# Patient Record
Sex: MALE | URBAN - METROPOLITAN AREA
[De-identification: names, ages, dates, MRNs, and addresses within clinical notes are randomized per-mention and may not be internally consistent; named-entity substitution may affect disease eponyms.]

---

## 2018-06-04 ENCOUNTER — APPOINTMENT (RX ONLY)
Age: 43
Setting detail: DERMATOLOGY
End: 2018-06-04

## 2018-06-04 DIAGNOSIS — L80 VITILIGO: ICD-10-CM

## 2018-06-04 PROCEDURE — 96920 EXCIMER LSR PSRIASIS<250SQCM: CPT

## 2018-06-04 PROCEDURE — ? COUNSELING

## 2018-06-04 PROCEDURE — ? PATIENT SPECIFIC COUNSELING

## 2018-06-04 PROCEDURE — ? OBSERVATION

## 2018-06-04 PROCEDURE — ? XTRAC LASER

## 2018-06-04 ASSESSMENT — SEVERITY VITILIGO: VITILIGO SEVERITY: 2

## 2018-06-04 ASSESSMENT — LOCATION ZONE DERM: LOCATION ZONE: FACE

## 2018-06-04 ASSESSMENT — LOCATION DETAILED DESCRIPTION DERM
LOCATION DETAILED: LEFT SUPERIOR MEDIAL BUCCAL CHEEK
LOCATION DETAILED: LEFT INFERIOR CENTRAL MALAR CHEEK

## 2018-06-04 ASSESSMENT — LOCATION SIMPLE DESCRIPTION DERM: LOCATION SIMPLE: LEFT CHEEK

## 2018-06-04 NOTE — PROCEDURE: PATIENT SPECIFIC COUNSELING
I have recommended staying away from hydrochlorothiazide, as it increases the risk of cancer on the lip
Detail Level: Simple

## 2018-06-07 ENCOUNTER — APPOINTMENT (RX ONLY)
Age: 43
Setting detail: DERMATOLOGY
End: 2018-06-07

## 2018-06-07 DIAGNOSIS — L80 VITILIGO: ICD-10-CM

## 2018-06-07 PROCEDURE — 96920 EXCIMER LSR PSRIASIS<250SQCM: CPT

## 2018-06-07 PROCEDURE — ? XTRAC LASER

## 2018-06-07 ASSESSMENT — LOCATION SIMPLE DESCRIPTION DERM: LOCATION SIMPLE: LEFT CHEEK

## 2018-06-07 ASSESSMENT — LOCATION ZONE DERM: LOCATION ZONE: FACE

## 2018-06-07 ASSESSMENT — LOCATION DETAILED DESCRIPTION DERM: LOCATION DETAILED: LEFT SUPERIOR MEDIAL BUCCAL CHEEK

## 2018-06-07 NOTE — PROCEDURE: XTRAC LASER
Location #4: lips
Dose Setting #3 (Mj/Cm2): 0
Location #1: wrist
Dose Settinge #4 (Mj/Cm2): 200
Consent: see scanned.
Post-Care Instructions: I reviewed with the patient in detail post-care instructions. Patient should stay away from the sun and wear sun protection until treated areas are fully healed.
Total Square Area In Cm2 (Required For Proper Billing): 92
Detail Level: Simple
Location #3: neck
Treatment Number: 2
Dose Setting #1 (Mj/Cm2): 450
Location #2: hands

## 2018-06-14 ENCOUNTER — APPOINTMENT (RX ONLY)
Age: 43
Setting detail: DERMATOLOGY
End: 2018-06-14

## 2018-06-14 DIAGNOSIS — L80 VITILIGO: ICD-10-CM

## 2018-06-14 PROCEDURE — 96920 EXCIMER LSR PSRIASIS<250SQCM: CPT

## 2018-06-14 PROCEDURE — ? XTRAC LASER

## 2018-06-14 ASSESSMENT — LOCATION DETAILED DESCRIPTION DERM: LOCATION DETAILED: LEFT SUPERIOR MEDIAL BUCCAL CHEEK

## 2018-06-14 ASSESSMENT — LOCATION SIMPLE DESCRIPTION DERM: LOCATION SIMPLE: LEFT CHEEK

## 2018-06-14 ASSESSMENT — LOCATION ZONE DERM: LOCATION ZONE: FACE

## 2018-06-14 NOTE — PROCEDURE: XTRAC LASER
Consent: see scanned.
Total Square Area In Cm2 (Required For Proper Billing): 208
Location #3: neck, lips
Total Pulses (Will Not Render If 0): 0
Total Energy In Joules: 95.0
Dose Setting #3 (Mj/Cm2): 250
Dose Setting #1 (Mj/Cm2): 450
Location #1: wrist, hands
Detail Level: Simple
Post-Care Instructions: I reviewed with the patient in detail post-care instructions. Patient should stay away from the sun and wear sun protection until treated areas are fully healed.
Treatment Number: 3

## 2018-06-18 ENCOUNTER — APPOINTMENT (RX ONLY)
Age: 43
Setting detail: DERMATOLOGY
End: 2018-06-18

## 2018-06-18 DIAGNOSIS — L80 VITILIGO: ICD-10-CM

## 2018-06-18 PROCEDURE — ? XTRAC LASER

## 2018-06-18 PROCEDURE — 96920 EXCIMER LSR PSRIASIS<250SQCM: CPT

## 2018-06-18 ASSESSMENT — LOCATION ZONE DERM: LOCATION ZONE: FACE

## 2018-06-18 ASSESSMENT — LOCATION DETAILED DESCRIPTION DERM: LOCATION DETAILED: LEFT SUPERIOR MEDIAL BUCCAL CHEEK

## 2018-06-18 ASSESSMENT — LOCATION SIMPLE DESCRIPTION DERM: LOCATION SIMPLE: LEFT CHEEK

## 2018-06-18 NOTE — PROCEDURE: XTRAC LASER
Total Square Area In Cm2 (Required For Proper Billing): 180
Consent: see scanned.
Detail Level: Simple
Post-Care Instructions: I reviewed with the patient in detail post-care instructions. Patient should stay away from the sun and wear sun protection until treated areas are fully healed.
Total Energy In Joules: 87.00
Total Pulses (Will Not Render If 0): 0
Treatment Number: 4
Dose Setting #3 (Mj/Cm2): 300
Location #1: wrist, hands
Dose Setting #1 (Mj/Cm2): 550
Location #3: neck, lips

## 2018-06-20 ENCOUNTER — APPOINTMENT (RX ONLY)
Age: 43
Setting detail: DERMATOLOGY
End: 2018-06-20

## 2018-06-20 DIAGNOSIS — L80 VITILIGO: ICD-10-CM

## 2018-06-20 PROCEDURE — 96920 EXCIMER LSR PSRIASIS<250SQCM: CPT

## 2018-06-20 PROCEDURE — ? XTRAC LASER

## 2018-06-20 ASSESSMENT — LOCATION SIMPLE DESCRIPTION DERM: LOCATION SIMPLE: LEFT CHEEK

## 2018-06-20 ASSESSMENT — LOCATION ZONE DERM: LOCATION ZONE: FACE

## 2018-06-20 ASSESSMENT — LOCATION DETAILED DESCRIPTION DERM: LOCATION DETAILED: LEFT SUPERIOR MEDIAL BUCCAL CHEEK

## 2018-06-20 NOTE — PROCEDURE: XTRAC LASER
Location #1: wrist, hands
Consent: see scanned.
Dose Setting #1 (Mj/Cm2): 600
Total Energy In Joules: 105.20
% Increase/Decrease From Last Treatment: 50
Dose Setting #3 (Mj/Cm2): 350
Detail Level: Simple
Post-Care Instructions: I reviewed with the patient in detail post-care instructions. Patient should stay away from the sun and wear sun protection until treated areas are fully healed.
Location #3: neck, lips
Total Pulses (Will Not Render If 0): 0
Total Square Area In Cm2 (Required For Proper Billing): 192
Treatment Number: 5

## 2018-06-22 ENCOUNTER — APPOINTMENT (RX ONLY)
Age: 43
Setting detail: DERMATOLOGY
End: 2018-06-22

## 2018-06-22 DIAGNOSIS — L80 VITILIGO: ICD-10-CM

## 2018-06-22 PROCEDURE — 96920 EXCIMER LSR PSRIASIS<250SQCM: CPT

## 2018-06-22 PROCEDURE — ? XTRAC LASER

## 2018-06-22 ASSESSMENT — LOCATION ZONE DERM: LOCATION ZONE: FACE

## 2018-06-22 ASSESSMENT — LOCATION DETAILED DESCRIPTION DERM: LOCATION DETAILED: LEFT SUPERIOR MEDIAL BUCCAL CHEEK

## 2018-06-22 ASSESSMENT — LOCATION SIMPLE DESCRIPTION DERM: LOCATION SIMPLE: LEFT CHEEK

## 2018-06-22 NOTE — PROCEDURE: XTRAC LASER
Dose Setting #3 (Mj/Cm2): 400
Dose Setting #1 (Mj/Cm2): 650
Total Pulses (Will Not Render If 0): 0
Location #3: neck, lips
Consent: see scanned.
Detail Level: Simple
Total Energy In Joules: 75.8
% Increase/Decrease From Last Treatment: 50
Total Square Area In Cm2 (Required For Proper Billing): 132
Post-Care Instructions: I reviewed with the patient in detail post-care instructions. Patient should stay away from the sun and wear sun protection until treated areas are fully healed.
Treatment Number: 6
Location #1: wrist, hands

## 2018-06-26 ENCOUNTER — APPOINTMENT (RX ONLY)
Age: 43
Setting detail: DERMATOLOGY
End: 2018-06-26

## 2018-06-26 DIAGNOSIS — L80 VITILIGO: ICD-10-CM

## 2018-06-26 PROCEDURE — 96920 EXCIMER LSR PSRIASIS<250SQCM: CPT

## 2018-06-26 PROCEDURE — ? XTRAC LASER

## 2018-06-26 ASSESSMENT — LOCATION SIMPLE DESCRIPTION DERM: LOCATION SIMPLE: LEFT CHEEK

## 2018-06-26 ASSESSMENT — LOCATION DETAILED DESCRIPTION DERM: LOCATION DETAILED: LEFT SUPERIOR MEDIAL BUCCAL CHEEK

## 2018-06-26 ASSESSMENT — LOCATION ZONE DERM: LOCATION ZONE: FACE

## 2018-06-26 NOTE — PROCEDURE: XTRAC LASER
Location #1: wrist, hands
Dose Setting #3 (Mj/Cm2): 450
Consent: see scanned.
Dose Setting #1 (Mj/Cm2): 700
% Increase/Decrease From Last Treatment: 50
Treatment Number: 7
Detail Level: Simple
Total Pulses (Will Not Render If 0): 0
Total Energy In Joules: 112.20
Post-Care Instructions: I reviewed with the patient in detail post-care instructions. Patient should stay away from the sun and wear sun protection until treated areas are fully healed.
Location #3: neck, lips
Total Square Area In Cm2 (Required For Proper Billing): 176

## 2018-06-28 ENCOUNTER — APPOINTMENT (RX ONLY)
Age: 43
Setting detail: DERMATOLOGY
End: 2018-06-28

## 2018-06-28 DIAGNOSIS — L80 VITILIGO: ICD-10-CM

## 2018-06-28 PROCEDURE — ? XTRAC LASER

## 2018-06-28 PROCEDURE — 96920 EXCIMER LSR PSRIASIS<250SQCM: CPT

## 2018-06-28 ASSESSMENT — LOCATION DETAILED DESCRIPTION DERM: LOCATION DETAILED: LEFT SUPERIOR MEDIAL BUCCAL CHEEK

## 2018-06-28 ASSESSMENT — LOCATION SIMPLE DESCRIPTION DERM: LOCATION SIMPLE: LEFT CHEEK

## 2018-06-28 ASSESSMENT — LOCATION ZONE DERM: LOCATION ZONE: FACE

## 2018-06-28 NOTE — PROCEDURE: XTRAC LASER
Location #1: wrist, hands
Post-Care Instructions: I reviewed with the patient in detail post-care instructions. Patient should stay away from the sun and wear sun protection until treated areas are fully healed.
Total Energy In Joules: 148
Dose Setting #1 (Mj/Cm2): 800
Total Square Area In Cm2 (Required For Proper Billing): 200
Detail Level: Simple
Total Pulses (Will Not Render If 0): 0
% Increase/Decrease From Last Treatment: 50
Location #3: neck, lips
Treatment Number: 8
Consent: see scanned.
Dose Setting #3 (Mj/Cm2): 500

## 2018-07-17 ENCOUNTER — APPOINTMENT (RX ONLY)
Age: 43
Setting detail: DERMATOLOGY
End: 2018-07-17

## 2018-07-17 DIAGNOSIS — L80 VITILIGO: ICD-10-CM

## 2018-07-17 PROCEDURE — ? XTRAC LASER

## 2018-07-17 PROCEDURE — 96920 EXCIMER LSR PSRIASIS<250SQCM: CPT

## 2018-07-17 ASSESSMENT — LOCATION DETAILED DESCRIPTION DERM: LOCATION DETAILED: LEFT SUPERIOR MEDIAL BUCCAL CHEEK

## 2018-07-17 ASSESSMENT — LOCATION SIMPLE DESCRIPTION DERM: LOCATION SIMPLE: LEFT CHEEK

## 2018-07-17 ASSESSMENT — LOCATION ZONE DERM: LOCATION ZONE: FACE

## 2018-07-17 NOTE — PROCEDURE: XTRAC LASER
Dose Setting #1 (Mj/Cm2): 800
ESTABLISHED PATIENT PRE-VISIT PLANNING     Note: Patient will not be contacted if there is no indication to call.     1.  Reviewed notes from the last few office visits within the medical group: Yes    2.  If any orders were placed at last visit or intended to be done for this visit (i.e. 6 mos follow-up), do we have Results/Consult Notes?        •  Labs - Labs ordered, NOT completed. Patient advised to complete prior to next appointment.       •  Imaging - Imaging was not ordered at last office visit.       •  Referrals - No referrals were ordered at last office visit.    3. Is this appointment scheduled as a Hospital Follow-Up? No    4.  Immunizations were updated in Epic using Web[employer]?: Epic matches [employer]       •  [employer] Recommendations: FLU, HEPATITIS A , HEPATITIS B and TD    5.  Patient is due for the following Health Maintenance Topics:   There are no preventive care reminders to display for this patient.    - Patient is up-to-date on all Health Maintenance topics. No records have been requested at this time.    6.  Patient was NOT informed to arrive 15 min prior to their scheduled appointment and bring in their medication bottles.  
Total Energy In Joules: 78.80
Total Square Area In Cm2 (Required For Proper Billing): 112
Detail Level: Simple
Location #1: wrist, hands
Location #3: neck, lips
Post-Care Instructions: I reviewed with the patient in detail post-care instructions. Patient should stay away from the sun and wear sun protection until treated areas are fully healed.
Treatment Number: 9
Dose Setting #3 (Mj/Cm2): 500
Total Pulses (Will Not Render If 0): 0
Consent: see scanned.

## 2018-07-19 ENCOUNTER — APPOINTMENT (RX ONLY)
Age: 43
Setting detail: DERMATOLOGY
End: 2018-07-19

## 2018-07-19 DIAGNOSIS — L80 VITILIGO: ICD-10-CM

## 2018-07-19 PROBLEM — J30.1 ALLERGIC RHINITIS DUE TO POLLEN: Status: ACTIVE | Noted: 2018-07-19

## 2018-07-19 PROCEDURE — 96920 EXCIMER LSR PSRIASIS<250SQCM: CPT

## 2018-07-19 PROCEDURE — ? XTRAC LASER

## 2018-07-19 ASSESSMENT — LOCATION ZONE DERM: LOCATION ZONE: FACE

## 2018-07-19 ASSESSMENT — LOCATION SIMPLE DESCRIPTION DERM: LOCATION SIMPLE: LEFT CHEEK

## 2018-07-19 ASSESSMENT — LOCATION DETAILED DESCRIPTION DERM: LOCATION DETAILED: LEFT SUPERIOR MEDIAL BUCCAL CHEEK

## 2018-07-19 NOTE — PROCEDURE: XTRAC LASER
Location #1: wrist, hands
Consent: see scanned.
Dose Setting #1 (Mj/Cm2): 850
Location #3: neck, lips
Treatment Number: 10
Detail Level: Simple
Total Pulses (Will Not Render If 0): 0
Post-Care Instructions: I reviewed with the patient in detail post-care instructions. Patient should stay away from the sun and wear sun protection until treated areas are fully healed.
Total Square Area In Cm2 (Required For Proper Billing): 96
Total Energy In Joules: 72.0
Dose Setting #3 (Mj/Cm2): 550

## 2018-07-24 ENCOUNTER — APPOINTMENT (RX ONLY)
Age: 43
Setting detail: DERMATOLOGY
End: 2018-07-24

## 2018-07-24 DIAGNOSIS — L80 VITILIGO: ICD-10-CM

## 2018-07-24 PROCEDURE — 96920 EXCIMER LSR PSRIASIS<250SQCM: CPT

## 2018-07-24 PROCEDURE — ? XTRAC LASER

## 2018-07-24 ASSESSMENT — LOCATION SIMPLE DESCRIPTION DERM: LOCATION SIMPLE: LEFT CHEEK

## 2018-07-24 ASSESSMENT — LOCATION DETAILED DESCRIPTION DERM: LOCATION DETAILED: LEFT SUPERIOR MEDIAL BUCCAL CHEEK

## 2018-07-24 ASSESSMENT — LOCATION ZONE DERM: LOCATION ZONE: FACE

## 2018-07-24 NOTE — PROCEDURE: XTRAC LASER
Treatment Number: 11
Post-Care Instructions: I reviewed with the patient in detail post-care instructions. Patient should stay away from the sun and wear sun protection until treated areas are fully healed.
Consent: see scanned.
Location #3: neck, lips
Location #1: wrist, hands
Total Square Area In Cm2 (Required For Proper Billing): 132
Total Pulses (Will Not Render If 0): 0
Total Energy In Joules: 97.20
Detail Level: Simple
Dose Setting #1 (Mj/Cm2): 900
Dose Setting #3 (Mj/Cm2): 600

## 2018-07-26 ENCOUNTER — APPOINTMENT (RX ONLY)
Age: 43
Setting detail: DERMATOLOGY
End: 2018-07-26

## 2018-07-26 DIAGNOSIS — L80 VITILIGO: ICD-10-CM

## 2018-07-26 PROCEDURE — 96920 EXCIMER LSR PSRIASIS<250SQCM: CPT

## 2018-07-26 PROCEDURE — ? XTRAC LASER

## 2018-07-26 ASSESSMENT — LOCATION DETAILED DESCRIPTION DERM: LOCATION DETAILED: LEFT SUPERIOR MEDIAL BUCCAL CHEEK

## 2018-07-26 ASSESSMENT — LOCATION ZONE DERM: LOCATION ZONE: FACE

## 2018-07-26 ASSESSMENT — LOCATION SIMPLE DESCRIPTION DERM: LOCATION SIMPLE: LEFT CHEEK

## 2018-07-26 NOTE — PROCEDURE: XTRAC LASER
Dose Setting #1 (Mj/Cm2): 950
Total Square Area In Cm2 (Required For Proper Billing): 84
Total Pulses (Will Not Render If 0): 0
Detail Level: Simple
Total Energy In Joules: 67.20
Location #3: neck, lips
Consent: see scanned.
Treatment Number: 12
Post-Care Instructions: I reviewed with the patient in detail post-care instructions. Patient should stay away from the sun and wear sun protection until treated areas are fully healed.
Location #1: wrist, hands
Dose Setting #3 (Mj/Cm2): 600

## 2018-07-31 ENCOUNTER — APPOINTMENT (RX ONLY)
Age: 43
Setting detail: DERMATOLOGY
End: 2018-07-31

## 2018-07-31 DIAGNOSIS — L80 VITILIGO: ICD-10-CM

## 2018-07-31 PROCEDURE — 96920 EXCIMER LSR PSRIASIS<250SQCM: CPT

## 2018-07-31 PROCEDURE — ? XTRAC LASER

## 2018-07-31 ASSESSMENT — LOCATION SIMPLE DESCRIPTION DERM: LOCATION SIMPLE: LEFT CHEEK

## 2018-07-31 ASSESSMENT — LOCATION DETAILED DESCRIPTION DERM: LOCATION DETAILED: LEFT SUPERIOR MEDIAL BUCCAL CHEEK

## 2018-07-31 ASSESSMENT — LOCATION ZONE DERM: LOCATION ZONE: FACE

## 2018-07-31 NOTE — PROCEDURE: XTRAC LASER
Dose Setting #3 (Mj/Cm2): 600
Total Pulses (Will Not Render If 0): 0
Dose Setting #1 (Mj/Cm2): 1000
Post-Care Instructions: I reviewed with the patient in detail post-care instructions. Patient should stay away from the sun and wear sun protection until treated areas are fully healed.
Location #1: wrist, hands
Total Square Area In Cm2 (Required For Proper Billing): 84
Detail Level: Simple
Treatment Number: 13
Consent: see scanned.
Total Energy In Joules: 71.20
Location #3: neck, lips

## 2018-08-02 ENCOUNTER — APPOINTMENT (RX ONLY)
Age: 43
Setting detail: DERMATOLOGY
End: 2018-08-02

## 2018-08-02 DIAGNOSIS — L80 VITILIGO: ICD-10-CM

## 2018-08-02 PROCEDURE — 96920 EXCIMER LSR PSRIASIS<250SQCM: CPT

## 2018-08-02 PROCEDURE — ? XTRAC LASER

## 2018-08-02 ASSESSMENT — LOCATION ZONE DERM: LOCATION ZONE: FACE

## 2018-08-02 ASSESSMENT — LOCATION DETAILED DESCRIPTION DERM: LOCATION DETAILED: LEFT SUPERIOR MEDIAL BUCCAL CHEEK

## 2018-08-02 ASSESSMENT — LOCATION SIMPLE DESCRIPTION DERM: LOCATION SIMPLE: LEFT CHEEK

## 2018-08-02 NOTE — PROCEDURE: XTRAC LASER
Location #1: wrist, hands
Total Square Area In Cm2 (Required For Proper Billing): 108
Dose Setting #3 (Mj/Cm2): 600
Treatment Number: 14
Post-Care Instructions: I reviewed with the patient in detail post-care instructions. Patient should stay away from the sun and wear sun protection until treated areas are fully healed.
Total Pulses (Will Not Render If 0): 0
Consent: see scanned.
Detail Level: Simple
Location #3: neck, lips
Total Energy In Joules: 95.40
Dose Setting #1 (Mj/Cm2): 1050

## 2018-08-07 ENCOUNTER — APPOINTMENT (RX ONLY)
Age: 43
Setting detail: DERMATOLOGY
End: 2018-08-07

## 2018-08-07 DIAGNOSIS — L80 VITILIGO: ICD-10-CM

## 2018-08-07 PROCEDURE — ? XTRAC LASER

## 2018-08-07 PROCEDURE — 96920 EXCIMER LSR PSRIASIS<250SQCM: CPT

## 2018-08-07 ASSESSMENT — LOCATION SIMPLE DESCRIPTION DERM: LOCATION SIMPLE: LEFT CHEEK

## 2018-08-07 ASSESSMENT — LOCATION DETAILED DESCRIPTION DERM: LOCATION DETAILED: LEFT SUPERIOR MEDIAL BUCCAL CHEEK

## 2018-08-07 ASSESSMENT — LOCATION ZONE DERM: LOCATION ZONE: FACE

## 2018-08-07 NOTE — PROCEDURE: XTRAC LASER
Location #2: lips
Treatment Number: 15
Dose Setting #3 (Mj/Cm2): 650
Post-Care Instructions: I reviewed with the patient in detail post-care instructions. Patient should stay away from the sun and wear sun protection until treated areas are fully healed.
Dose Setting #1 (Mj/Cm2): 1050
Total Pulses (Will Not Render If 0): 0
Location #3: neck and chin
Dose Setting #2 (Mj/Cm2): 600
Detail Level: Simple
Total Square Area In Cm2 (Required For Proper Billing): 76
Location #1: wrist, hands
Consent: see scanned.
Total Energy In Joules: 68.2

## 2018-08-28 ENCOUNTER — APPOINTMENT (RX ONLY)
Age: 43
Setting detail: DERMATOLOGY
End: 2018-08-28

## 2018-08-28 DIAGNOSIS — L80 VITILIGO: ICD-10-CM

## 2018-08-28 PROCEDURE — 96920 EXCIMER LSR PSRIASIS<250SQCM: CPT

## 2018-08-28 PROCEDURE — ? XTRAC LASER

## 2018-08-28 ASSESSMENT — LOCATION ZONE DERM: LOCATION ZONE: FACE

## 2018-08-28 ASSESSMENT — LOCATION SIMPLE DESCRIPTION DERM: LOCATION SIMPLE: LEFT CHEEK

## 2018-08-28 ASSESSMENT — LOCATION DETAILED DESCRIPTION DERM: LOCATION DETAILED: LEFT SUPERIOR MEDIAL BUCCAL CHEEK

## 2018-08-28 NOTE — PROCEDURE: XTRAC LASER
Consent: see scanned.
Detail Level: Simple
Total Pulses (Will Not Render If 0): 0
Total Square Area In Cm2 (Required For Proper Billing): 76
Treatment Number: 16
Location #1: wrist, hands
Location #3: neck and chin
Total Energy In Joules: 68.2
Post-Care Instructions: I reviewed with the patient in detail post-care instructions. Patient should stay away from the sun and wear sun protection until treated areas are fully healed.
Dose Setting #1 (Mj/Cm2): 1150
Dose Setting #3 (Mj/Cm2): 700
Dose Setting #2 (Mj/Cm2): 600
Location #2: lips

## 2018-09-04 ENCOUNTER — APPOINTMENT (RX ONLY)
Age: 43
Setting detail: DERMATOLOGY
End: 2018-09-04

## 2018-09-04 DIAGNOSIS — L80 VITILIGO: ICD-10-CM

## 2018-09-04 PROCEDURE — 96920 EXCIMER LSR PSRIASIS<250SQCM: CPT

## 2018-09-04 PROCEDURE — ? XTRAC LASER

## 2018-09-04 ASSESSMENT — LOCATION DETAILED DESCRIPTION DERM: LOCATION DETAILED: LEFT SUPERIOR MEDIAL BUCCAL CHEEK

## 2018-09-04 ASSESSMENT — LOCATION ZONE DERM: LOCATION ZONE: FACE

## 2018-09-04 ASSESSMENT — LOCATION SIMPLE DESCRIPTION DERM: LOCATION SIMPLE: LEFT CHEEK

## 2018-09-12 ENCOUNTER — APPOINTMENT (RX ONLY)
Age: 43
Setting detail: DERMATOLOGY
End: 2018-09-12

## 2018-09-12 DIAGNOSIS — L80 VITILIGO: ICD-10-CM

## 2018-09-12 PROCEDURE — ? XTRAC LASER

## 2018-09-12 PROCEDURE — 96920 EXCIMER LSR PSRIASIS<250SQCM: CPT

## 2018-09-12 ASSESSMENT — LOCATION DETAILED DESCRIPTION DERM: LOCATION DETAILED: LEFT SUPERIOR MEDIAL BUCCAL CHEEK

## 2018-09-12 ASSESSMENT — LOCATION SIMPLE DESCRIPTION DERM: LOCATION SIMPLE: LEFT CHEEK

## 2018-09-12 ASSESSMENT — LOCATION ZONE DERM: LOCATION ZONE: FACE

## 2018-09-12 NOTE — PROCEDURE: XTRAC LASER
Location #1: wrist, hands
Consent: see scanned.
Dose Setting #2 (Mj/Cm2): 500
Total Energy In Joules: 86.20
Total Square Area In Cm2 (Required For Proper Billing): 116
Dose Setting #1 (Mj/Cm2): 1250
Post-Care Instructions: I reviewed with the patient in detail post-care instructions. Patient should stay away from the sun and wear sun protection until treated areas are fully healed.
Treatment Number: 18
Location #3: neck and chin
Dose Setting #3 (Mj/Cm2): 800
Total Pulses (Will Not Render If 0): 0
Location #2: lips
Detail Level: Simple

## 2018-10-10 ENCOUNTER — APPOINTMENT (RX ONLY)
Age: 43
Setting detail: DERMATOLOGY
End: 2018-10-10

## 2018-10-10 DIAGNOSIS — L80 VITILIGO: ICD-10-CM

## 2018-10-10 PROCEDURE — ? XTRAC LASER

## 2018-10-10 PROCEDURE — 96920 EXCIMER LSR PSRIASIS<250SQCM: CPT

## 2018-10-10 ASSESSMENT — LOCATION ZONE DERM: LOCATION ZONE: FACE

## 2018-10-10 ASSESSMENT — LOCATION SIMPLE DESCRIPTION DERM: LOCATION SIMPLE: LEFT CHEEK

## 2018-10-10 ASSESSMENT — LOCATION DETAILED DESCRIPTION DERM: LOCATION DETAILED: LEFT SUPERIOR MEDIAL BUCCAL CHEEK

## 2018-10-10 NOTE — PROCEDURE: XTRAC LASER
Dose Setting #3 (Mj/Cm2): 800
Location #3: neck and chin
Dose Setting #1 (Mj/Cm2): 1250
Location #1: wrist, hands
Treatment Number: 19
Total Energy In Joules: 69.4
Total Pulses (Will Not Render If 0): 0
Post-Care Instructions: I reviewed with the patient in detail post-care instructions. Patient should stay away from the sun and wear sun protection until treated areas are fully healed.
Dose Setting #2 (Mj/Cm2): 500
Detail Level: Simple
Total Square Area In Cm2 (Required For Proper Billing): 72
Patient Id: [id number]
Location #2: lips
Consent: see scanned.

## 2018-10-16 ENCOUNTER — APPOINTMENT (RX ONLY)
Age: 43
Setting detail: DERMATOLOGY
End: 2018-10-16

## 2018-10-16 DIAGNOSIS — L80 VITILIGO: ICD-10-CM

## 2018-10-16 PROCEDURE — ? ORDER TESTS

## 2018-10-16 PROCEDURE — ? XTRAC LASER

## 2018-10-16 PROCEDURE — 96920 EXCIMER LSR PSRIASIS<250SQCM: CPT

## 2018-10-16 ASSESSMENT — LOCATION DETAILED DESCRIPTION DERM: LOCATION DETAILED: LEFT SUPERIOR MEDIAL BUCCAL CHEEK

## 2018-10-16 ASSESSMENT — LOCATION ZONE DERM: LOCATION ZONE: FACE

## 2018-10-16 ASSESSMENT — LOCATION SIMPLE DESCRIPTION DERM: LOCATION SIMPLE: LEFT CHEEK

## 2018-10-16 NOTE — PROCEDURE: XTRAC LASER
Post-Care Instructions: I reviewed with the patient in detail post-care instructions. Patient should stay away from the sun and wear sun protection until treated areas are fully healed.
Dose Setting #3 (Mj/Cm2): 800
Location #1: wrist, hands
Treatment Number: 20
Location #2: lips
Total Square Area In Cm2 (Required For Proper Billing): 116
Dose Setting #1 (Mj/Cm2): 1300
Dose Setting #2 (Mj/Cm2): 500
Consent: see scanned.
Detail Level: Simple
Patient Id: [id number]
Total Energy In Joules: 99.60
Location #3: neck and chin
Total Pulses (Will Not Render If 0): 0

## 2018-10-29 ENCOUNTER — APPOINTMENT (RX ONLY)
Age: 43
Setting detail: DERMATOLOGY
End: 2018-10-29

## 2018-10-29 DIAGNOSIS — L80 VITILIGO: ICD-10-CM

## 2018-10-29 PROCEDURE — ? ORDER TESTS

## 2018-10-29 PROCEDURE — ? XTRAC LASER

## 2018-10-29 PROCEDURE — 96920 EXCIMER LSR PSRIASIS<250SQCM: CPT

## 2018-10-29 ASSESSMENT — LOCATION ZONE DERM: LOCATION ZONE: FACE

## 2018-10-29 ASSESSMENT — LOCATION DETAILED DESCRIPTION DERM: LOCATION DETAILED: LEFT SUPERIOR MEDIAL BUCCAL CHEEK

## 2018-10-29 ASSESSMENT — LOCATION SIMPLE DESCRIPTION DERM: LOCATION SIMPLE: LEFT CHEEK

## 2018-10-29 NOTE — PROCEDURE: XTRAC LASER
Detail Level: Simple
Location #2: lips
Treatment Number: 21
Location #1: wrist, hands
Total Energy In Joules: 99.00
Dose Setting #3 (Mj/Cm2): 85
Dose Setting #1 (Mj/Cm2): 1350
Location #3: neck and chin
Post-Care Instructions: I reviewed with the patient in detail post-care instructions. Patient should stay away from the sun and wear sun protection until treated areas are fully healed.
Patient Id: [id number]
Consent: see scanned.
Total Square Area In Cm2 (Required For Proper Billing): 104
Total Pulses (Will Not Render If 0): 0
Dose Setting #2 (Mj/Cm2): 500

## 2018-11-06 ENCOUNTER — APPOINTMENT (RX ONLY)
Age: 43
Setting detail: DERMATOLOGY
End: 2018-11-06

## 2018-11-06 DIAGNOSIS — L80 VITILIGO: ICD-10-CM

## 2018-11-06 PROCEDURE — 96920 EXCIMER LSR PSRIASIS<250SQCM: CPT

## 2018-11-06 PROCEDURE — ? XTRAC LASER

## 2018-11-06 ASSESSMENT — LOCATION DETAILED DESCRIPTION DERM: LOCATION DETAILED: LEFT SUPERIOR MEDIAL BUCCAL CHEEK

## 2018-11-06 ASSESSMENT — LOCATION ZONE DERM: LOCATION ZONE: FACE

## 2018-11-06 ASSESSMENT — LOCATION SIMPLE DESCRIPTION DERM: LOCATION SIMPLE: LEFT CHEEK

## 2018-11-06 NOTE — PROCEDURE: XTRAC LASER
Detail Level: Simple
Dose Setting #3 (Mj/Cm2): 85
Dose Setting #1 (Mj/Cm2): 1350
Consent: see scanned.
Location #1: wrist, hands
Location #2: lips
Total Pulses (Will Not Render If 0): 0
Location #3: neck and chin
Total Square Area In Cm2 (Required For Proper Billing): 104
Dose Setting #2 (Mj/Cm2): 500
Post-Care Instructions: I reviewed with the patient in detail post-care instructions. Patient should stay away from the sun and wear sun protection until treated areas are fully healed.
Treatment Number: 21
Total Energy In Joules: 99.00
Patient Id: [id number]

## 2018-12-10 ENCOUNTER — APPOINTMENT (RX ONLY)
Age: 43
Setting detail: DERMATOLOGY
End: 2018-12-10

## 2018-12-10 DIAGNOSIS — L80 VITILIGO: ICD-10-CM

## 2018-12-10 PROCEDURE — 96920 EXCIMER LSR PSRIASIS<250SQCM: CPT

## 2018-12-10 PROCEDURE — ? XTRAC LASER

## 2018-12-10 ASSESSMENT — LOCATION ZONE DERM: LOCATION ZONE: FACE

## 2018-12-10 ASSESSMENT — LOCATION DETAILED DESCRIPTION DERM: LOCATION DETAILED: LEFT SUPERIOR MEDIAL BUCCAL CHEEK

## 2018-12-10 ASSESSMENT — LOCATION SIMPLE DESCRIPTION DERM: LOCATION SIMPLE: LEFT CHEEK

## 2018-12-10 NOTE — PROCEDURE: XTRAC LASER
Total Square Area In Cm2 (Required For Proper Billing): 76
Location #2: lips
Detail Level: Simple
Dose Setting #3 (Mj/Cm2): 850
Total Pulses (Will Not Render If 0): 0
Dose Setting #2 (Mj/Cm2): 500
Post-Care Instructions: I reviewed with the patient in detail post-care instructions. Patient should stay away from the sun and wear sun protection until treated areas are fully healed.
Treatment Number: 23
Consent: see scanned.
Location #1: wrist, hands
Location #3: neck and chin
Total Energy In Joules: 75.80
Patient Id: [id number]
Dose Setting #1 (Mj/Cm2): 1450

## 2018-12-13 ENCOUNTER — APPOINTMENT (RX ONLY)
Age: 43
Setting detail: DERMATOLOGY
End: 2018-12-13

## 2018-12-13 DIAGNOSIS — L80 VITILIGO: ICD-10-CM

## 2018-12-13 PROCEDURE — 96920 EXCIMER LSR PSRIASIS<250SQCM: CPT

## 2018-12-13 PROCEDURE — ? XTRAC LASER

## 2018-12-13 ASSESSMENT — LOCATION SIMPLE DESCRIPTION DERM: LOCATION SIMPLE: LEFT CHEEK

## 2018-12-13 ASSESSMENT — LOCATION DETAILED DESCRIPTION DERM: LOCATION DETAILED: LEFT SUPERIOR MEDIAL BUCCAL CHEEK

## 2018-12-13 ASSESSMENT — LOCATION ZONE DERM: LOCATION ZONE: FACE

## 2018-12-13 NOTE — PROCEDURE: XTRAC LASER
Dose Setting #3 (Mj/Cm2): 850
Dose Setting #1 (Mj/Cm2): 1500
Total Square Area In Cm2 (Required For Proper Billing): 80
Treatment Number: 24
Location #1: wrist, hands
Total Pulses (Will Not Render If 0): 0
Post-Care Instructions: I reviewed with the patient in detail post-care instructions. Patient should stay away from the sun and wear sun protection until treated areas are fully healed.
Detail Level: Simple
Dose Setting #2 (Mj/Cm2): 500
Location #3: neck and chin
Consent: see scanned.
Patient Id: [id number]
Total Energy In Joules: 60
Location #2: lips

## 2019-01-15 ENCOUNTER — APPOINTMENT (RX ONLY)
Age: 44
Setting detail: DERMATOLOGY
End: 2019-01-15

## 2019-01-15 DIAGNOSIS — L80 VITILIGO: ICD-10-CM

## 2019-01-15 PROCEDURE — ? XTRAC LASER

## 2019-01-15 PROCEDURE — 96920 EXCIMER LSR PSRIASIS<250SQCM: CPT

## 2019-01-15 ASSESSMENT — LOCATION ZONE DERM: LOCATION ZONE: FACE

## 2019-01-15 ASSESSMENT — LOCATION SIMPLE DESCRIPTION DERM: LOCATION SIMPLE: LEFT CHEEK

## 2019-01-15 ASSESSMENT — LOCATION DETAILED DESCRIPTION DERM: LOCATION DETAILED: LEFT SUPERIOR MEDIAL BUCCAL CHEEK

## 2019-01-15 NOTE — PROCEDURE: XTRAC LASER
Location #2: lips
Location #3: neck and chin
Dose Setting #2 (Mj/Cm2): 550
Total Energy In Joules: 126.20
Dose Setting #3 (Mj/Cm2): 950
Location #1: wrist, hands
Dose Setting #1 (Mj/Cm2): 1554
Consent: see scanned.
Total Pulses (Will Not Render If 0): 0
Post-Care Instructions: I reviewed with the patient in detail post-care instructions. Patient should stay away from the sun and wear sun protection until treated areas are fully healed.
Detail Level: Simple
Total Square Area In Cm2 (Required For Proper Billing): 100
Patient Id: [id number]
Treatment Number: 25

## 2019-01-17 ENCOUNTER — APPOINTMENT (RX ONLY)
Age: 44
Setting detail: DERMATOLOGY
End: 2019-01-17

## 2019-01-17 DIAGNOSIS — L80 VITILIGO: ICD-10-CM

## 2019-01-17 PROCEDURE — ? XTRAC LASER

## 2019-01-17 PROCEDURE — 96920 EXCIMER LSR PSRIASIS<250SQCM: CPT

## 2019-01-17 ASSESSMENT — LOCATION ZONE DERM: LOCATION ZONE: FACE

## 2019-01-17 ASSESSMENT — LOCATION SIMPLE DESCRIPTION DERM: LOCATION SIMPLE: LEFT CHEEK

## 2019-01-17 ASSESSMENT — LOCATION DETAILED DESCRIPTION DERM: LOCATION DETAILED: LEFT SUPERIOR MEDIAL BUCCAL CHEEK

## 2019-01-17 NOTE — PROCEDURE: XTRAC LASER
Total Square Area In Cm2 (Required For Proper Billing): 92
Treatment Number: 26
Consent: see scanned.
Patient Id: [id number]
Total Pulses (Will Not Render If 0): 0
Dose Setting #3 (Mj/Cm2): 950
Dose Setting #1 (Mj/Cm2): 1600
Dose Setting #2 (Mj/Cm2): 550
Total Energy In Joules: 119.40
Location #2: lips
Detail Level: Simple
Post-Care Instructions: I reviewed with the patient in detail post-care instructions. Patient should stay away from the sun and wear sun protection until treated areas are fully healed.
Location #3: neck and chin
Location #1: wrist, hands

## 2019-01-22 ENCOUNTER — APPOINTMENT (RX ONLY)
Age: 44
Setting detail: DERMATOLOGY
End: 2019-01-22

## 2019-01-22 DIAGNOSIS — L80 VITILIGO: ICD-10-CM

## 2019-01-22 PROCEDURE — 96920 EXCIMER LSR PSRIASIS<250SQCM: CPT

## 2019-01-22 PROCEDURE — ? XTRAC LASER

## 2019-01-22 ASSESSMENT — LOCATION DETAILED DESCRIPTION DERM: LOCATION DETAILED: LEFT SUPERIOR MEDIAL BUCCAL CHEEK

## 2019-01-22 ASSESSMENT — LOCATION SIMPLE DESCRIPTION DERM: LOCATION SIMPLE: LEFT CHEEK

## 2019-01-22 ASSESSMENT — LOCATION ZONE DERM: LOCATION ZONE: FACE

## 2019-01-22 NOTE — PROCEDURE: XTRAC LASER
Total Square Area In Cm2 (Required For Proper Billing): 144
Dose Setting #1 (Mj/Cm2): 0043
Treatment Number: 27
Patient Id: [id number]
Total Energy In Joules: 138.40
Location #3: neck and chin
Total Pulses (Will Not Render If 0): 0
Dose Setting #2 (Mj/Cm2): 550
Location #1: wrist, hands
Consent: see scanned.
Post-Care Instructions: I reviewed with the patient in detail post-care instructions. Patient should stay away from the sun and wear sun protection until treated areas are fully healed.
Detail Level: Simple
Dose Setting #3 (Mj/Cm2): 200
Location #2: lips

## 2019-01-28 ENCOUNTER — RX ONLY (OUTPATIENT)
Age: 44
Setting detail: RX ONLY
End: 2019-01-28

## 2019-01-28 ENCOUNTER — APPOINTMENT (RX ONLY)
Age: 44
Setting detail: DERMATOLOGY
End: 2019-01-28

## 2019-01-28 DIAGNOSIS — L80 VITILIGO: ICD-10-CM

## 2019-01-28 PROCEDURE — ? XTRAC LASER

## 2019-01-28 PROCEDURE — 96920 EXCIMER LSR PSRIASIS<250SQCM: CPT

## 2019-01-28 RX ORDER — TACROLIMUS 1 MG/G
OINTMENT TOPICAL
Qty: 1 | Refills: 6 | Status: ERX | COMMUNITY
Start: 2019-01-28

## 2019-01-28 ASSESSMENT — LOCATION DETAILED DESCRIPTION DERM: LOCATION DETAILED: LEFT SUPERIOR MEDIAL BUCCAL CHEEK

## 2019-01-28 ASSESSMENT — LOCATION SIMPLE DESCRIPTION DERM: LOCATION SIMPLE: LEFT CHEEK

## 2019-01-28 ASSESSMENT — LOCATION ZONE DERM: LOCATION ZONE: FACE

## 2019-01-28 NOTE — PROCEDURE: XTRAC LASER
Consent: see scanned.
Location #1: wrist, hands
Patient Id: [id number]
Total Square Area In Cm2 (Required For Proper Billing): 168
Location #3: neck and chin
Treatment Number: 28
Total Pulses (Will Not Render If 0): 0
Location #2: lips
Total Energy In Joules: 145.80
Detail Level: Simple
Dose Setting #2 (Mj/Cm2): 600
Dose Setting #3 (Mj/Cm2): 250
Post-Care Instructions: I reviewed with the patient in detail post-care instructions. Patient should stay away from the sun and wear sun protection until treated areas are fully healed.
Dose Setting #1 (Mj/Cm2): 1700

## 2019-02-05 ENCOUNTER — APPOINTMENT (RX ONLY)
Age: 44
Setting detail: DERMATOLOGY
End: 2019-02-05

## 2019-02-05 DIAGNOSIS — L80 VITILIGO: ICD-10-CM

## 2019-02-05 PROCEDURE — ? XTRAC LASER

## 2019-02-05 PROCEDURE — 96920 EXCIMER LSR PSRIASIS<250SQCM: CPT

## 2019-02-05 ASSESSMENT — LOCATION SIMPLE DESCRIPTION DERM: LOCATION SIMPLE: LEFT CHEEK

## 2019-02-05 ASSESSMENT — LOCATION DETAILED DESCRIPTION DERM: LOCATION DETAILED: LEFT SUPERIOR MEDIAL BUCCAL CHEEK

## 2019-02-05 ASSESSMENT — LOCATION ZONE DERM: LOCATION ZONE: FACE

## 2019-02-05 NOTE — PROCEDURE: XTRAC LASER
Location #1: wrist, hands
Dose Setting #2 (Mj/Cm2): 700
Post-Care Instructions: I reviewed with the patient in detail post-care instructions. Patient should stay away from the sun and wear sun protection until treated areas are fully healed.
Dose Setting #3 (Mj/Cm2): 350
Total Energy In Joules: 142.40
Location #2: lips
Patient Id: [id number]
Treatment Number: 29
Location #3: neck and chin
Detail Level: Simple
Total Pulses (Will Not Render If 0): 0
Dose Setting #1 (Mj/Cm2): 1800
Total Square Area In Cm2 (Required For Proper Billing): 160
Consent: see scanned.

## 2019-02-07 ENCOUNTER — APPOINTMENT (RX ONLY)
Age: 44
Setting detail: DERMATOLOGY
End: 2019-02-07

## 2019-02-07 DIAGNOSIS — L80 VITILIGO: ICD-10-CM

## 2019-02-07 PROCEDURE — ? XTRAC LASER

## 2019-02-07 PROCEDURE — 96920 EXCIMER LSR PSRIASIS<250SQCM: CPT

## 2019-02-07 ASSESSMENT — LOCATION ZONE DERM: LOCATION ZONE: FACE

## 2019-02-07 ASSESSMENT — LOCATION DETAILED DESCRIPTION DERM: LOCATION DETAILED: LEFT SUPERIOR MEDIAL BUCCAL CHEEK

## 2019-02-07 ASSESSMENT — LOCATION SIMPLE DESCRIPTION DERM: LOCATION SIMPLE: LEFT CHEEK

## 2019-02-07 NOTE — PROCEDURE: XTRAC LASER
Total Pulses (Will Not Render If 0): 0
Dose Setting #3 (Mj/Cm2): 550
Detail Level: Simple
Patient Id: [id number]
Consent: see scanned.
Treatment Number: 30
Post-Care Instructions: I reviewed with the patient in detail post-care instructions. Patient should stay away from the sun and wear sun protection until treated areas are fully healed.
Dose Setting #1 (Mj/Cm2): 2000
Location #3: neck and chin
Dose Setting #2 (Mj/Cm2): 800
Location #2: lips
Total Square Area In Cm2 (Required For Proper Billing): 192
Total Energy In Joules: 216.20
Location #1: wrist, hands

## 2019-02-26 ENCOUNTER — APPOINTMENT (RX ONLY)
Age: 44
Setting detail: DERMATOLOGY
End: 2019-02-26

## 2019-02-26 DIAGNOSIS — L80 VITILIGO: ICD-10-CM

## 2019-02-26 PROCEDURE — ? XTRAC LASER

## 2019-02-26 PROCEDURE — 96920 EXCIMER LSR PSRIASIS<250SQCM: CPT

## 2019-02-26 ASSESSMENT — LOCATION ZONE DERM: LOCATION ZONE: FACE

## 2019-02-26 ASSESSMENT — LOCATION SIMPLE DESCRIPTION DERM: LOCATION SIMPLE: LEFT CHEEK

## 2019-02-26 ASSESSMENT — LOCATION DETAILED DESCRIPTION DERM: LOCATION DETAILED: LEFT SUPERIOR MEDIAL BUCCAL CHEEK

## 2019-02-26 NOTE — PROCEDURE: XTRAC LASER
Patient Id: [id number]
Total Pulses (Will Not Render If 0): 0
Detail Level: Simple
Consent: see scanned.
Location #3: neck and chin
Total Energy In Joules: 216.20
Location #1: wrist, hands
Total Square Area In Cm2 (Required For Proper Billing): 192
Dose Setting #3 (Mj/Cm2): 600
Treatment Number: 31
Location #2: lips
Post-Care Instructions: I reviewed with the patient in detail post-care instructions. Patient should stay away from the sun and wear sun protection until treated areas are fully healed.
Dose Setting #2 (Mj/Cm2): 850
Dose Setting #1 (Mj/Cm2): 2200

## 2019-03-20 ENCOUNTER — APPOINTMENT (RX ONLY)
Age: 44
Setting detail: DERMATOLOGY
End: 2019-03-20

## 2019-03-20 DIAGNOSIS — L80 VITILIGO: ICD-10-CM

## 2019-03-20 PROCEDURE — 96920 EXCIMER LSR PSRIASIS<250SQCM: CPT

## 2019-03-20 PROCEDURE — ? XTRAC LASER

## 2019-03-20 ASSESSMENT — LOCATION DETAILED DESCRIPTION DERM: LOCATION DETAILED: LEFT SUPERIOR MEDIAL BUCCAL CHEEK

## 2019-03-20 ASSESSMENT — LOCATION SIMPLE DESCRIPTION DERM: LOCATION SIMPLE: LEFT CHEEK

## 2019-03-20 ASSESSMENT — LOCATION ZONE DERM: LOCATION ZONE: FACE

## 2019-03-20 NOTE — PROCEDURE: XTRAC LASER
Post-Care Instructions: I reviewed with the patient in detail post-care instructions. Patient should stay away from the sun and wear sun protection until treated areas are fully healed.
Consent: see scanned.
Location #3: neck and chin
Total Square Area In Cm2 (Required For Proper Billing): 164
Dose Setting #2 (Mj/Cm2): 850
Dose Setting #1 (Mj/Cm2): 2300
Detail Level: Simple
Total Energy In Joules: 243.40
Treatment Number: 32
Total Pulses (Will Not Render If 0): 0
Patient Id: [id number]
Location #2: lips
Dose Setting #3 (Mj/Cm2): 600
Location #1: wrist, hands

## 2019-04-01 ENCOUNTER — APPOINTMENT (RX ONLY)
Age: 44
Setting detail: DERMATOLOGY
End: 2019-04-01

## 2019-04-01 DIAGNOSIS — L80 VITILIGO: ICD-10-CM

## 2019-04-01 PROCEDURE — 96920 EXCIMER LSR PSRIASIS<250SQCM: CPT

## 2019-04-01 PROCEDURE — ? XTRAC LASER

## 2019-04-01 ASSESSMENT — LOCATION DETAILED DESCRIPTION DERM: LOCATION DETAILED: LEFT SUPERIOR MEDIAL BUCCAL CHEEK

## 2019-04-01 ASSESSMENT — LOCATION SIMPLE DESCRIPTION DERM: LOCATION SIMPLE: LEFT CHEEK

## 2019-04-01 ASSESSMENT — LOCATION ZONE DERM: LOCATION ZONE: FACE

## 2019-04-01 NOTE — PROCEDURE: XTRAC LASER
Dose Setting #1 (Mj/Cm2): 2406
Detail Level: Simple
Patient Id: [id number]
Total Energy In Joules: 281.80
Total Pulses (Will Not Render If 0): 0
Location #3: neck and chin
Consent: see scanned.
Total Square Area In Cm2 (Required For Proper Billing): 184
Post-Care Instructions: I reviewed with the patient in detail post-care instructions. Patient should stay away from the sun and wear sun protection until treated areas are fully healed.
Treatment Number: 33
Location #1: wrist, hands
Dose Setting #2 (Mj/Cm2): 850
Location #2: lips
Dose Setting #3 (Mj/Cm2): 600

## 2019-05-16 ENCOUNTER — APPOINTMENT (RX ONLY)
Age: 44
Setting detail: DERMATOLOGY
End: 2019-05-16

## 2019-05-16 DIAGNOSIS — L80 VITILIGO: ICD-10-CM

## 2019-05-16 PROCEDURE — 96920 EXCIMER LSR PSRIASIS<250SQCM: CPT

## 2019-05-16 PROCEDURE — ? XTRAC LASER

## 2019-05-16 ASSESSMENT — LOCATION DETAILED DESCRIPTION DERM: LOCATION DETAILED: LEFT SUPERIOR MEDIAL BUCCAL CHEEK

## 2019-05-16 ASSESSMENT — LOCATION SIMPLE DESCRIPTION DERM: LOCATION SIMPLE: LEFT CHEEK

## 2019-05-16 ASSESSMENT — LOCATION ZONE DERM: LOCATION ZONE: FACE

## 2019-05-29 ENCOUNTER — APPOINTMENT (RX ONLY)
Age: 44
Setting detail: DERMATOLOGY
End: 2019-05-29

## 2019-05-29 DIAGNOSIS — L80 VITILIGO: ICD-10-CM

## 2019-05-29 PROCEDURE — 96920 EXCIMER LSR PSRIASIS<250SQCM: CPT

## 2019-05-29 PROCEDURE — ? XTRAC LASER

## 2019-05-29 ASSESSMENT — LOCATION SIMPLE DESCRIPTION DERM: LOCATION SIMPLE: LEFT CHEEK

## 2019-05-29 ASSESSMENT — LOCATION ZONE DERM: LOCATION ZONE: FACE

## 2019-05-29 ASSESSMENT — LOCATION DETAILED DESCRIPTION DERM: LOCATION DETAILED: LEFT SUPERIOR MEDIAL BUCCAL CHEEK

## 2019-05-29 NOTE — PROCEDURE: XTRAC LASER
Location #1: wrist, hands
Total Energy In Joules: 276.20
Treatment Number: 35
Dose Setting #2 (Mj/Cm2): 850
Location #2: lips
Consent: see scanned.
Patient Id: [id number]
Post-Care Instructions: I reviewed with the patient in detail post-care instructions. Patient should stay away from the sun and wear sun protection until treated areas are fully healed.
Dose Setting #3 (Mj/Cm2): 750
Dose Setting #1 (Mj/Cm2): 8438
Location #3: neck and chin
Total Pulses (Will Not Render If 0): 0
Total Square Area In Cm2 (Required For Proper Billing): 136
Detail Level: Simple

## 2019-06-03 ENCOUNTER — APPOINTMENT (RX ONLY)
Age: 44
Setting detail: DERMATOLOGY
End: 2019-06-03

## 2019-06-03 DIAGNOSIS — L80 VITILIGO: ICD-10-CM

## 2019-06-03 PROCEDURE — ? XTRAC LASER

## 2019-06-03 PROCEDURE — 96920 EXCIMER LSR PSRIASIS<250SQCM: CPT

## 2019-06-03 ASSESSMENT — LOCATION DETAILED DESCRIPTION DERM: LOCATION DETAILED: LEFT SUPERIOR MEDIAL BUCCAL CHEEK

## 2019-06-03 ASSESSMENT — LOCATION SIMPLE DESCRIPTION DERM: LOCATION SIMPLE: LEFT CHEEK

## 2019-06-03 ASSESSMENT — LOCATION ZONE DERM: LOCATION ZONE: FACE

## 2019-06-03 NOTE — PROCEDURE: XTRAC LASER
Dose Setting #2 (Mj/Cm2): 850
Patient Id: [id number]
Post-Care Instructions: I reviewed with the patient in detail post-care instructions. Patient should stay away from the sun and wear sun protection until treated areas are fully healed.
Total Square Area In Cm2 (Required For Proper Billing): 144
Consent: see scanned.
Treatment Number: 36
Location #3: neck and chin
Total Energy In Joules: 287.80
Location #1: wrist, hands
Total Pulses (Will Not Render If 0): 0
Dose Setting #3 (Mj/Cm2): 800
Location #2: lips
Dose Setting #1 (Mj/Cm2): 0204
Detail Level: Simple

## 2019-06-05 ENCOUNTER — APPOINTMENT (RX ONLY)
Age: 44
Setting detail: DERMATOLOGY
End: 2019-06-05

## 2019-06-05 DIAGNOSIS — L80 VITILIGO: ICD-10-CM

## 2019-06-05 PROCEDURE — 96920 EXCIMER LSR PSRIASIS<250SQCM: CPT

## 2019-06-05 PROCEDURE — ? XTRAC LASER

## 2019-06-05 ASSESSMENT — LOCATION SIMPLE DESCRIPTION DERM: LOCATION SIMPLE: LEFT CHEEK

## 2019-06-05 ASSESSMENT — LOCATION DETAILED DESCRIPTION DERM: LOCATION DETAILED: LEFT SUPERIOR MEDIAL BUCCAL CHEEK

## 2019-06-05 ASSESSMENT — LOCATION ZONE DERM: LOCATION ZONE: FACE

## 2019-06-05 NOTE — PROCEDURE: XTRAC LASER
Detail Level: Simple
Patient Id: [id number]
Location #3: neck and chin
Total Energy In Joules: 246.40
Total Pulses (Will Not Render If 0): 0
Dose Setting #3 (Mj/Cm2): 850
Post-Care Instructions: I reviewed with the patient in detail post-care instructions. Patient should stay away from the sun and wear sun protection until treated areas are fully healed.
Dose Setting #1 (Mj/Cm2): 8712
Location #1: wrist, hands
Consent: see scanned.
Treatment Number: 37
Total Square Area In Cm2 (Required For Proper Billing): 120
Location #2: lips

## 2019-06-18 ENCOUNTER — APPOINTMENT (RX ONLY)
Age: 44
Setting detail: DERMATOLOGY
End: 2019-06-18

## 2019-06-18 DIAGNOSIS — L80 VITILIGO: ICD-10-CM

## 2019-06-18 DIAGNOSIS — L259 CONTACT DERMATITIS AND OTHER ECZEMA, UNSPECIFIED CAUSE: ICD-10-CM

## 2019-06-18 PROBLEM — L30.8 OTHER SPECIFIED DERMATITIS: Status: ACTIVE | Noted: 2019-06-18

## 2019-06-18 PROCEDURE — ? PRESCRIPTION

## 2019-06-18 PROCEDURE — 96920 EXCIMER LSR PSRIASIS<250SQCM: CPT

## 2019-06-18 PROCEDURE — ? XTRAC LASER

## 2019-06-18 PROCEDURE — 99213 OFFICE O/P EST LOW 20 MIN: CPT | Mod: 25

## 2019-06-18 PROCEDURE — ? COUNSELING

## 2019-06-18 RX ORDER — BETAMETHASONE DIPROPIONATE 0.5 MG/G
OINTMENT, AUGMENTED TOPICAL
Qty: 1 | Refills: 0 | Status: ERX | COMMUNITY
Start: 2019-06-18

## 2019-06-18 RX ADMIN — BETAMETHASONE DIPROPIONATE: 0.5 OINTMENT, AUGMENTED TOPICAL at 18:50

## 2019-06-18 ASSESSMENT — LOCATION SIMPLE DESCRIPTION DERM: LOCATION SIMPLE: LEFT CHEEK

## 2019-06-18 ASSESSMENT — LOCATION DETAILED DESCRIPTION DERM: LOCATION DETAILED: LEFT SUPERIOR MEDIAL BUCCAL CHEEK

## 2019-06-18 ASSESSMENT — LOCATION ZONE DERM: LOCATION ZONE: FACE

## 2019-06-18 NOTE — PROCEDURE: COUNSELING
Detail Level: Generalized
Patient Specific Counseling (Will Not Stick From Patient To Patient): Apply 1% hydrocortisone ointment to eyelid (OTC) for one week as needed

## 2019-06-18 NOTE — PROCEDURE: XTRAC LASER
Dose Setting #1 (Mj/Cm2): 2930
Dose Setting #2 (Mj/Cm2): 750
Location #3: neck and chin
Total Pulses (Will Not Render If 0): 0
Consent: see scanned.
Dose Setting #3 (Mj/Cm2): 900
Detail Level: Simple
Post-Care Instructions: I reviewed with the patient in detail post-care instructions. Patient should stay away from the sun and wear sun protection until treated areas are fully healed.
Treatment Number: 38
Patient Id: [id number]
Location #2: lips
Total Energy In Joules: 316.60
Location #1: wrist, hands
Total Square Area In Cm2 (Required For Proper Billing): 148

## 2019-07-09 ENCOUNTER — APPOINTMENT (RX ONLY)
Age: 44
Setting detail: DERMATOLOGY
End: 2019-07-09

## 2019-07-09 DIAGNOSIS — L80 VITILIGO: ICD-10-CM

## 2019-07-09 PROCEDURE — 96920 EXCIMER LSR PSRIASIS<250SQCM: CPT

## 2019-07-09 PROCEDURE — ? XTRAC LASER

## 2019-07-09 ASSESSMENT — LOCATION ZONE DERM: LOCATION ZONE: FACE

## 2019-07-09 ASSESSMENT — LOCATION DETAILED DESCRIPTION DERM: LOCATION DETAILED: LEFT SUPERIOR MEDIAL BUCCAL CHEEK

## 2019-07-09 ASSESSMENT — LOCATION SIMPLE DESCRIPTION DERM: LOCATION SIMPLE: LEFT CHEEK

## 2019-07-09 NOTE — PROCEDURE: XTRAC LASER
Post-Care Instructions: I reviewed with the patient in detail post-care instructions. Patient should stay away from the sun and wear sun protection until treated areas are fully healed.
Dose Setting #2 (Mj/Cm2): 750
Location #1: wrist, hands
Patient Id: [id number]
Dose Setting #3 (Mj/Cm2): 900
Consent: see scanned.
Detail Level: Simple
Total Pulses (Will Not Render If 0): 0
Total Energy In Joules: 316.60
Location #2: lips
Dose Setting #1 (Mj/Cm2): 2900
Total Square Area In Cm2 (Required For Proper Billing): 128
Location #3: neck and chin
Treatment Number: 38

## 2019-07-24 ENCOUNTER — APPOINTMENT (RX ONLY)
Age: 44
Setting detail: DERMATOLOGY
End: 2019-07-24

## 2019-07-24 DIAGNOSIS — L80 VITILIGO: ICD-10-CM

## 2019-07-24 PROCEDURE — ? XTRAC LASER

## 2019-07-24 PROCEDURE — 96920 EXCIMER LSR PSRIASIS<250SQCM: CPT

## 2019-07-24 ASSESSMENT — LOCATION SIMPLE DESCRIPTION DERM: LOCATION SIMPLE: LEFT CHEEK

## 2019-07-24 ASSESSMENT — LOCATION ZONE DERM: LOCATION ZONE: FACE

## 2019-07-24 ASSESSMENT — LOCATION DETAILED DESCRIPTION DERM: LOCATION DETAILED: LEFT SUPERIOR MEDIAL BUCCAL CHEEK

## 2019-07-24 NOTE — PROCEDURE: XTRAC LASER
Treatment Number: 40
Location #1: wrist, hands
Post-Care Instructions: I reviewed with the patient in detail post-care instructions. Patient should stay away from the sun and wear sun protection until treated areas are fully healed.
Total Pulses (Will Not Render If 0): 0
Total Energy In Joules: 224.20
Total Square Area In Cm2 (Required For Proper Billing): 76
Consent: see scanned.
Patient Id: [id number]
Detail Level: Simple
Location #2: lips
Dose Setting #1 (Mj/Cm2): 2958
Location #3: neck and chin

## 2019-08-01 ENCOUNTER — APPOINTMENT (RX ONLY)
Age: 44
Setting detail: DERMATOLOGY
End: 2019-08-01

## 2019-08-01 DIAGNOSIS — L80 VITILIGO: ICD-10-CM

## 2019-08-01 DIAGNOSIS — L57.8 OTHER SKIN CHANGES DUE TO CHRONIC EXPOSURE TO NONIONIZING RADIATION: ICD-10-CM

## 2019-08-01 DIAGNOSIS — L82.1 OTHER SEBORRHEIC KERATOSIS: ICD-10-CM

## 2019-08-01 DIAGNOSIS — D22 MELANOCYTIC NEVI: ICD-10-CM

## 2019-08-01 DIAGNOSIS — L85.3 XEROSIS CUTIS: ICD-10-CM

## 2019-08-01 PROBLEM — D22.72 MELANOCYTIC NEVI OF LEFT LOWER LIMB, INCLUDING HIP: Status: ACTIVE | Noted: 2019-08-01

## 2019-08-01 PROBLEM — D22.9 MELANOCYTIC NEVI, UNSPECIFIED: Status: ACTIVE | Noted: 2019-08-01

## 2019-08-01 PROBLEM — D23.9 OTHER BENIGN NEOPLASM OF SKIN, UNSPECIFIED: Status: ACTIVE | Noted: 2019-08-01

## 2019-08-01 PROCEDURE — ? XTRAC LASER

## 2019-08-01 PROCEDURE — ? COUNSELING

## 2019-08-01 PROCEDURE — 99214 OFFICE O/P EST MOD 30 MIN: CPT | Mod: 25

## 2019-08-01 PROCEDURE — ? KOH PREP

## 2019-08-01 PROCEDURE — ? PRESCRIPTION

## 2019-08-01 PROCEDURE — 87220 TISSUE EXAM FOR FUNGI: CPT

## 2019-08-01 PROCEDURE — ? PATIENT SPECIFIC COUNSELING

## 2019-08-01 PROCEDURE — ? OBSERVATION

## 2019-08-01 PROCEDURE — 96920 EXCIMER LSR PSRIASIS<250SQCM: CPT

## 2019-08-01 RX ORDER — BETAMETHASONE DIPROPIONATE 0.5 MG/G
OINTMENT, AUGMENTED TOPICAL QD
Qty: 1 | Refills: 2 | Status: ERX

## 2019-08-01 ASSESSMENT — LOCATION ZONE DERM
LOCATION ZONE: TRUNK
LOCATION ZONE: FACE
LOCATION ZONE: FEET

## 2019-08-01 ASSESSMENT — LOCATION SIMPLE DESCRIPTION DERM
LOCATION SIMPLE: CHEST
LOCATION SIMPLE: LEFT CHEEK
LOCATION SIMPLE: LEFT UPPER BACK
LOCATION SIMPLE: LEFT PLANTAR SURFACE

## 2019-08-01 ASSESSMENT — LOCATION DETAILED DESCRIPTION DERM
LOCATION DETAILED: LEFT SUPERIOR MEDIAL BUCCAL CHEEK
LOCATION DETAILED: LEFT INSTEP
LOCATION DETAILED: LEFT INFERIOR MEDIAL UPPER BACK
LOCATION DETAILED: STERNUM

## 2019-08-01 NOTE — PROCEDURE: XTRAC LASER
Location #1: wrist, hands
Total Energy In Joules: 302.60
Total Pulses (Will Not Render If 0): 0
Detail Level: Simple
Consent: see scanned.
Total Square Area In Cm2 (Required For Proper Billing): 76
Location #3: neck and chin
Patient Id: [id number]
Dose Setting #1 (Mj/Cm2): 8803
Post-Care Instructions: I reviewed with the patient in detail post-care instructions. Patient should stay away from the sun and wear sun protection until treated areas are fully healed.
Treatment Number: 41
Dose Setting #2 (Mj/Cm2): 500
Location #2: lips

## 2019-08-13 ENCOUNTER — APPOINTMENT (RX ONLY)
Age: 44
Setting detail: DERMATOLOGY
End: 2019-08-13

## 2019-08-13 DIAGNOSIS — L80 VITILIGO: ICD-10-CM

## 2019-08-13 PROCEDURE — 96920 EXCIMER LSR PSRIASIS<250SQCM: CPT

## 2019-08-13 PROCEDURE — ? XTRAC LASER

## 2019-08-13 ASSESSMENT — LOCATION ZONE DERM: LOCATION ZONE: FACE

## 2019-08-13 ASSESSMENT — LOCATION SIMPLE DESCRIPTION DERM: LOCATION SIMPLE: LEFT CHEEK

## 2019-08-13 ASSESSMENT — LOCATION DETAILED DESCRIPTION DERM: LOCATION DETAILED: LEFT SUPERIOR MEDIAL BUCCAL CHEEK

## 2019-08-13 NOTE — PROCEDURE: XTRAC LASER
Patient Id: [id number]
Total Square Area In Cm2 (Required For Proper Billing): 132
Location #2: lips
Dose Setting #2 (Mj/Cm2): 500
Total Energy In Joules: 288.60
Location #1: wrist, hands
Post-Care Instructions: I reviewed with the patient in detail post-care instructions. Patient should stay away from the sun and wear sun protection until treated areas are fully healed.
Total Pulses (Will Not Render If 0): 0
Consent: see scanned.
Treatment Number: 41
Detail Level: Simple
Location #3: neck and chin
Dose Setting #1 (Mj/Cm2): 6035

## 2019-09-20 ENCOUNTER — APPOINTMENT (RX ONLY)
Age: 44
Setting detail: DERMATOLOGY
End: 2019-09-20

## 2019-09-20 DIAGNOSIS — L80 VITILIGO: ICD-10-CM

## 2019-09-20 PROCEDURE — 96920 EXCIMER LSR PSRIASIS<250SQCM: CPT

## 2019-09-20 PROCEDURE — ? XTRAC LASER

## 2019-09-20 ASSESSMENT — LOCATION SIMPLE DESCRIPTION DERM: LOCATION SIMPLE: LEFT CHEEK

## 2019-09-20 ASSESSMENT — LOCATION DETAILED DESCRIPTION DERM: LOCATION DETAILED: LEFT SUPERIOR MEDIAL BUCCAL CHEEK

## 2019-09-20 ASSESSMENT — LOCATION ZONE DERM: LOCATION ZONE: FACE

## 2019-09-20 NOTE — PROCEDURE: XTRAC LASER
Treatment Number: 43
Patient Id: [id number]
Location #2: lips
Detail Level: Simple
Total Energy In Joules: 377.00
Total Square Area In Cm2 (Required For Proper Billing): 116
Location #3: neck and chin
Consent: see scanned.
Dose Setting #1 (Mj/Cm2): 4155
Location #1: wrist, hands
Total Pulses (Will Not Render If 0): 0
Post-Care Instructions: I reviewed with the patient in detail post-care instructions. Patient should stay away from the sun and wear sun protection until treated areas are fully healed.

## 2020-01-16 ENCOUNTER — APPOINTMENT (RX ONLY)
Age: 45
Setting detail: DERMATOLOGY
End: 2020-01-16

## 2020-01-16 DIAGNOSIS — M79.2 NEURALGIA AND NEURITIS, UNSPECIFIED: ICD-10-CM

## 2020-01-16 DIAGNOSIS — L30.0 NUMMULAR DERMATITIS: ICD-10-CM

## 2020-01-16 DIAGNOSIS — K11.6 MUCOCELE OF SALIVARY GLAND: ICD-10-CM

## 2020-01-16 DIAGNOSIS — D17 BENIGN LIPOMATOUS NEOPLASM: ICD-10-CM

## 2020-01-16 DIAGNOSIS — L80 VITILIGO: ICD-10-CM | Status: IMPROVED

## 2020-01-16 PROBLEM — D23.9 OTHER BENIGN NEOPLASM OF SKIN, UNSPECIFIED: Status: ACTIVE | Noted: 2020-01-16

## 2020-01-16 PROBLEM — D17.24 BENIGN LIPOMATOUS NEOPLASM OF SKIN AND SUBCUTANEOUS TISSUE OF LEFT LEG: Status: ACTIVE | Noted: 2020-01-16

## 2020-01-16 PROBLEM — L85.3 XEROSIS CUTIS: Status: ACTIVE | Noted: 2020-01-16

## 2020-01-16 PROCEDURE — ? OBSERVATION

## 2020-01-16 PROCEDURE — ? PRESCRIPTION

## 2020-01-16 PROCEDURE — ? COUNSELING

## 2020-01-16 PROCEDURE — 96999 UNLISTED SPEC DERM SVC/PX: CPT

## 2020-01-16 PROCEDURE — 99213 OFFICE O/P EST LOW 20 MIN: CPT | Mod: 25

## 2020-01-16 PROCEDURE — ? DIAGNOSIS COMMENT

## 2020-01-16 PROCEDURE — ? XTRAC LASER

## 2020-01-16 RX ORDER — BETAMETHASONE DIPROPIONATE 0.5 MG/G
OINTMENT, AUGMENTED TOPICAL
Qty: 1 | Refills: 2 | Status: ERX

## 2020-01-16 ASSESSMENT — LOCATION SIMPLE DESCRIPTION DERM
LOCATION SIMPLE: RIGHT WRIST
LOCATION SIMPLE: LEFT THIGH
LOCATION SIMPLE: LEFT WRIST

## 2020-01-16 ASSESSMENT — LOCATION ZONE DERM
LOCATION ZONE: LEG
LOCATION ZONE: ARM

## 2020-01-16 ASSESSMENT — LOCATION DETAILED DESCRIPTION DERM
LOCATION DETAILED: LEFT VENTRAL WRIST
LOCATION DETAILED: LEFT ANTERIOR PROXIMAL THIGH
LOCATION DETAILED: RIGHT VENTRAL WRIST

## 2020-01-16 NOTE — PROCEDURE: DIAGNOSIS COMMENT
Comment: tingling of lips/tongue are consistent with B12 deficiency given his lifelong vegetarian diet
Detail Level: Zone

## 2020-01-16 NOTE — PROCEDURE: COUNSELING
Detail Level: Simple
Detail Level: Generalized
Patient Specific Counseling (Will Not Stick From Patient To Patient): **Take sublingual vitamin B12 supplement and RTC if symptoms don't improve.
Detail Level: Detailed

## 2020-01-16 NOTE — PROCEDURE: XTRAC LASER
Total Energy In Joules: 247.00
Detail Level: Detailed
Post-Care Instructions: I reviewed with the patient in detail post-care instructions. Patient should stay away from the sun and wear sun protection until treated areas are fully healed.
Location #1: wrists, fingers
Total Pulses (Will Not Render If 0): 0
Total Square Area In Cm2 (Whole Numbers Only Please): 76
Billing: [id number] (Unlisted special dermatological service or procedure)
Dose Setting #1 (Mj/Cm2): 6526
Consent: Written consent obtained, risks reviewed including but not limited to crusting, scabbing, blistering, scarring, darker or lighter pigmentary change, incidental hair removal, bruising, and/or incomplete removal.
Treatment Number: 44

## 2021-06-09 NOTE — PROCEDURE: XTRAC LASER
Pre-Procedure Unconfirmed COVID Test     [facility]    COVID Screening  Due to the inability to confirm the patient's COVID status (positive or negative), the patient was screened for COVID symptoms     Patient reports the following:  Fever? No   Cough? No   Shortness of breath? No   Skin rash? No    If the patient is positive for new symptoms or worsening symptoms, and the procedure is deemed necessary by the ordering provider, notify your manager/supervisor     Patient Information  Patient informed of the no visitor policy  Patient instructed to continue to self-quarantine prior to procedure  Patient informed to contact the ordering provider if the following symptoms develop prior to procedure:   Fever  Cough  Shortness of Breath  Sore throat   Runny or stuffy nose  Muscle or body aches  Headaches  Fatigue  Vomiting or diarrhea   Rash    Dawit Boyd  
Location #2: hands
Location #3: neck
Detail Level: Simple
Consent: see scanned.
Post-Care Instructions: I reviewed with the patient in detail post-care instructions. Patient should stay away from the sun and wear sun protection until treated areas are fully healed.
Treatment Number: 1
Location #1: wrist
Location #4: lips
Dose Setting #2 (Mj/Cm2): 400
Dose Setting #3 (Mj/Cm2): 150
Total Square Area In Cm2 (Required For Proper Billing): 156
Dose Setting #1 (Mj/Cm2): 250
Total Pulses (Will Not Render If 0): 0

## 2023-07-11 NOTE — PROCEDURE: XTRAC LASER
Total Pulses (Will Not Render If 0): 0
Dose Setting #2 (Mj/Cm2): 850
Treatment Number: 34
Total Energy In Joules: 182.60
Detail Level: Simple
Consent: see scanned.
Dose Setting #1 (Mj/Cm2): 2500
Location #1: wrist, hands
Dose Setting #3 (Mj/Cm2): 650
Total Square Area In Cm2 (Required For Proper Billing): 76
Detail Level: Simple
Location #2: lips
Information: This plan will allow you to select a body location and will not render the procedure on the note output. It will note the location you select in the morphology.
Post-Care Instructions: I reviewed with the patient in detail post-care instructions. Patient should stay away from the sun and wear sun protection until treated areas are fully healed.
Location #3: neck and chin
Patient Id: [id number]